# Patient Record
Sex: FEMALE | Race: WHITE | NOT HISPANIC OR LATINO | URBAN - METROPOLITAN AREA
[De-identification: names, ages, dates, MRNs, and addresses within clinical notes are randomized per-mention and may not be internally consistent; named-entity substitution may affect disease eponyms.]

---

## 2017-08-22 ENCOUNTER — IMPORTED ENCOUNTER (OUTPATIENT)
Dept: URBAN - METROPOLITAN AREA CLINIC 50 | Facility: CLINIC | Age: 72
End: 2017-08-22

## 2017-09-07 ENCOUNTER — IMPORTED ENCOUNTER (OUTPATIENT)
Dept: URBAN - METROPOLITAN AREA CLINIC 50 | Facility: CLINIC | Age: 72
End: 2017-09-07

## 2017-09-13 ENCOUNTER — IMPORTED ENCOUNTER (OUTPATIENT)
Dept: URBAN - METROPOLITAN AREA CLINIC 50 | Facility: CLINIC | Age: 72
End: 2017-09-13

## 2017-09-21 ENCOUNTER — IMPORTED ENCOUNTER (OUTPATIENT)
Dept: URBAN - METROPOLITAN AREA CLINIC 50 | Facility: CLINIC | Age: 72
End: 2017-09-21

## 2017-09-21 NOTE — PATIENT DISCUSSION
"""S/P IOL OD: Tecnis ZCB00 16.5 (ORA) (Target: [place]) +ORA/LenSx/Arcs +TM/Omidria. Continue post operative instructions and drops per schedule.  """

## 2017-09-27 ENCOUNTER — IMPORTED ENCOUNTER (OUTPATIENT)
Dept: URBAN - METROPOLITAN AREA CLINIC 50 | Facility: CLINIC | Age: 72
End: 2017-09-27

## 2017-09-27 NOTE — PATIENT DISCUSSION
"""[facility] with IOL OS: 10[facility]05/2017 [facility] Target: [facility]

## 2017-10-05 ENCOUNTER — IMPORTED ENCOUNTER (OUTPATIENT)
Dept: URBAN - METROPOLITAN AREA CLINIC 50 | Facility: CLINIC | Age: 72
End: 2017-10-05

## 2017-10-05 NOTE — PATIENT DISCUSSION
"""S/P IOL OS: Tecnis ZCB00 15.5 (Target: [place]) +LenSx/Arcs +TM/Omidria. Continue post operative instructions and drops per schedule.  """

## 2017-10-11 ENCOUNTER — IMPORTED ENCOUNTER (OUTPATIENT)
Dept: URBAN - METROPOLITAN AREA CLINIC 50 | Facility: CLINIC | Age: 72
End: 2017-10-11

## 2017-11-01 ENCOUNTER — IMPORTED ENCOUNTER (OUTPATIENT)
Dept: URBAN - METROPOLITAN AREA CLINIC 50 | Facility: CLINIC | Age: 72
End: 2017-11-01

## 2017-11-28 ENCOUNTER — IMPORTED ENCOUNTER (OUTPATIENT)
Dept: URBAN - METROPOLITAN AREA CLINIC 50 | Facility: CLINIC | Age: 72
End: 2017-11-28

## 2018-01-09 ENCOUNTER — IMPORTED ENCOUNTER (OUTPATIENT)
Dept: URBAN - METROPOLITAN AREA CLINIC 50 | Facility: CLINIC | Age: 73
End: 2018-01-09

## 2018-02-15 ENCOUNTER — IMPORTED ENCOUNTER (OUTPATIENT)
Dept: URBAN - METROPOLITAN AREA CLINIC 50 | Facility: CLINIC | Age: 73
End: 2018-02-15

## 2018-08-16 ENCOUNTER — IMPORTED ENCOUNTER (OUTPATIENT)
Dept: URBAN - METROPOLITAN AREA CLINIC 50 | Facility: CLINIC | Age: 73
End: 2018-08-16

## 2018-10-02 ENCOUNTER — IMPORTED ENCOUNTER (OUTPATIENT)
Dept: URBAN - METROPOLITAN AREA CLINIC 50 | Facility: CLINIC | Age: 73
End: 2018-10-02

## 2018-10-23 ENCOUNTER — IMPORTED ENCOUNTER (OUTPATIENT)
Dept: URBAN - METROPOLITAN AREA CLINIC 50 | Facility: CLINIC | Age: 73
End: 2018-10-23

## 2021-04-17 ASSESSMENT — VISUAL ACUITY
OS_BAT: >20/400
OD_CC: 20/20
OD_CC: J1+
OS_CC: 20/40
OS_PH: 20/80
OD_CC: J1@ 16 IN
OS_SC: 20/25-
OS_CC: 20/200
OD_CC: 20/30
OS_BAT: 20/60
OS_SC: 20/25-
OD_BAT: 20/30
OD_OTHER: 20/50. 20/80.
OS_OTHER: >20/400. >20/400.
OD_SC: 20/20
OS_CC: J5@ 16 IN
OS_CC: J3@ 12 IN
OD_CC: 20/30
OS_CC: J1+
OD_SC: 20/25=
OS_BAT: >20/400
OD_OTHER: 20/30. 20/50.
OS_CC: 20/40
OS_CC: 20/30
OD_BAT: > 20/400
OS_OTHER: >20/400.
OS_CC: 20/30-2
OD_CC: 20/400
OD_OTHER: > 20/400.
OD_SC: 20/25
OS_CC: 20/30
OD_SC: 20/40
OS_OTHER: 20/40. 20/50.
OS_CC: J1
OS_PH: 20/20
OD_CC: 20/40
OS_CC: J1@ 16 IN
OD_BAT: 20/50
OD_CC: J1
OS_OTHER: 20/30. 20/60.
OD_CC: J3@ 12 IN
OD_PH: 20/60
OD_SC: 20/25
OS_CC: 20/200
OD_OTHER: > 20/400.
OS_BAT: 20/40
OD_BAT: 20/30
OS_SC: 20/30
OS_BAT: 20/30
OS_OTHER: 20/60. 20/100.
OD_OTHER: 20/30. 20/40.
OS_BAT: >20/400
OS_PH: 20/30
OD_CC: 20/25-1
OD_CC: J5@ 16 IN
OD_CC: 20/60-1
OS_OTHER: >20/400. >20/400.
OD_BAT: > 20/400
OS_CC: 20/200

## 2021-04-17 ASSESSMENT — TONOMETRY
OD_IOP_MMHG: 17
OS_IOP_MMHG: 08
OD_IOP_MMHG: 12
OD_IOP_MMHG: 17
OD_IOP_MMHG: 14
OD_IOP_MMHG: 17
OS_IOP_MMHG: 17
OS_IOP_MMHG: 12
OS_IOP_MMHG: 13
OD_IOP_MMHG: 14
OS_IOP_MMHG: 14
OS_IOP_MMHG: 18
OD_IOP_MMHG: 13
OD_IOP_MMHG: 14
OD_IOP_MMHG: 12
OS_IOP_MMHG: 14
OS_IOP_MMHG: 16
OS_IOP_MMHG: 14
OS_IOP_MMHG: 15
OS_IOP_MMHG: 12
OD_IOP_MMHG: 30